# Patient Record
(demographics unavailable — no encounter records)

---

## 2025-07-02 NOTE — HISTORY OF PRESENT ILLNESS
[de-identified] : RECURRENT POSITIONAL DIZZINESS X 3 YEARS HX OF CARDIAC AMYLOIDOSIS X 3 YEARS DIAGNOSED WHEN HAD SURGERY FOR HER AMAN CARPAL TUNNEL MEDICAL HX REVIEWED PND

## 2025-07-02 NOTE — ASSESSMENT
[FreeTextEntry1] :  WNL [facility] CARDIOLOGY FOLLOW UP AMYLOIDOSIS DISCUSSED F/U AFTER ABOVE

## 2025-07-02 NOTE — REVIEW OF SYSTEMS
[Sneezing] : sneezing [Seasonal Allergies] : seasonal allergies [Post Nasal Drip] : post nasal drip [Ear Pain] : ear pain [Vertigo] : vertigo [Lightheadedness] : lightheadedness [Ear Noises] : ear noises [Nasal Congestion] : nasal congestion [Problem Snoring] : problem snoring [Sinus Pressure] : sinus pressure [Snoring With Pauses] : snoring with pauses [Throat Clearing] : throat clearing [Throat Dryness] : throat dryness [Eyes Itch] : itching of the eyes [Chest Pain] : chest pain [Palpitations] : palpitations [Heartburn] : heartburn [Joint Pain] : joint pain [Anxiety] : anxiety [Depression] : depression [Easy Bruising] : a tendency for easy bruising [Negative] : Neurological [Patient Intake Form Reviewed] : Patient intake form was reviewed [FreeTextEntry7] : Difficulty swallowing  [FreeTextEntry9] : Muscle aches  [de-identified] : Skin and hair changes  [de-identified] : Feel warmer than others  [de-identified] : Sweating at night

## 2025-07-15 NOTE — PHYSICAL EXAM
[Normal Breath Sounds] : Normal breath sounds [Normal Heart Sounds] : normal heart sounds [Normal Rate and Rhythm] : normal rate and rhythm [No Rash or Lesion] : No rash or lesion [Alert] : alert [Oriented to Person] : oriented to person [Oriented to Place] : oriented to place [Oriented to Time] : oriented to time [Calm] : calm [de-identified] : soft, NT  [de-identified] : NAD [de-identified] : NC/AT [de-identified] : +ROM [de-identified] : intact

## 2025-07-15 NOTE — REASON FOR VISIT
[Consultation] : a consultation visit [FreeTextEntry1] : pt intake forms reviewed Mirvaso Counseling: Mirvaso is a topical medication which can decrease superficial blood flow where applied. Side effects are uncommon and include stinging, redness and allergic reactions.

## 2025-07-15 NOTE — HISTORY OF PRESENT ILLNESS
[FreeTextEntry1] : 63 y/o female here for evaluation of abdominal pain.   Pt was here in May 2014, and Dr. Rodriguez performed a low anterior resection for recurrent diverticulitis.  About 3 years ago, pt had a pain on her LLQ. She underwent a D+C at her GYN. Her left side continued to be bothersome. She went to GI Dr. Mario in March 2024. CT abd/pelvis was performed, which was negative. She also underwent a colonoscopy a month later - NL study. LLQ pain still persists. She currently is feeling very bloated, and states pain is becoming more generalized in her abdomen.  She also reports feeling very constipated, though she has a BM daily.  Pt denies n/v, diarrhea, and bleeding.

## 2025-07-15 NOTE — REVIEW OF SYSTEMS
[As Noted in HPI] : as noted in HPI [Negative] : Heme/Lymph [FreeTextEntry5] : cardiac amyloidosis  [FreeTextEntry6] : +asthma

## 2025-07-15 NOTE — ASSESSMENT
[FreeTextEntry1] : Left lower quadrant abdominal pain with history of sigmoid colon resection for diverticular disease - Patient had CT scan approximately 1 year ago which was otherwise negative for intra-abdominal findings - She has undergone a vaginal ultrasound which was negative for pathology - Repeat colonoscopy 1 year ago showed no diverticulosis or otherwise obvious causes for left lower quadrant pain - Symptoms have been present for approximately 3 years but have been worse over the last 6 months - I recommended repeat CT scan to evaluate for any current etiology of pain - Could consider repeat sigmoidoscopy if no findings on imaging - If workup otherwise is negative would consider follow-up with gastroenterology for possible functional gastrointestinal cause - Continue fiber supplementation - All questions answered - Colonoscopy report reviewed - CT scan report reviewed, could not evaluate imaging